# Patient Record
Sex: FEMALE | Race: WHITE
[De-identification: names, ages, dates, MRNs, and addresses within clinical notes are randomized per-mention and may not be internally consistent; named-entity substitution may affect disease eponyms.]

---

## 2018-04-10 ENCOUNTER — HOSPITAL ENCOUNTER (EMERGENCY)
Dept: HOSPITAL 17 - PHEFT | Age: 23
Discharge: HOME | End: 2018-04-10
Payer: COMMERCIAL

## 2018-04-10 VITALS
OXYGEN SATURATION: 100 % | HEART RATE: 115 BPM | SYSTOLIC BLOOD PRESSURE: 153 MMHG | DIASTOLIC BLOOD PRESSURE: 68 MMHG | RESPIRATION RATE: 16 BRPM | TEMPERATURE: 98.2 F

## 2018-04-10 VITALS — WEIGHT: 141.54 LBS | HEIGHT: 67 IN | BODY MASS INDEX: 22.21 KG/M2
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
## 2018-04-10 DIAGNOSIS — Z23: ICD-10-CM

## 2018-04-10 DIAGNOSIS — S61.217A: Primary | ICD-10-CM
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
## 2018-04-10 PROCEDURE — 12001 RPR S/N/AX/GEN/TRNK 2.5CM/<: CPT

## 2018-04-10 PROCEDURE — 90471 IMMUNIZATION ADMIN: CPT

## 2018-04-10 PROCEDURE — 90714 TD VACC NO PRESV 7 YRS+ IM: CPT

## 2018-04-10 NOTE — PD
HPI


Chief Complaint:  Laceration/Skin Injury


Time Seen by Provider:  15:54


Travel History


International Travel<30 days:  No


Contact w/Intl Traveler<30days:  No


Traveled to known affect area:  No





History of Present Illness


HPI


23-year-old female presents emergency department for evaluation of left pinky 

finger laceration as she was cutting up meat at work today.  Patient states 

that she actually got cut by a  resulting in this injury.  States 

the area bled significantly but was controllable with pressure.  States the 

pain is moderate in severity.  Aching.  Denies any numbness or tingling.  

Denies any weakness of the finger.  Denies chronic medical issues medication 

use.  She has not remember her last tetanus vaccination.  She has no other 

complaints today.





[facility]


Past Medical History


Medical History:  Denies Significant Hx


Diminished Hearing:  No


Influenza Vaccination:  No


Pregnant?:  Not Pregnant





Past Surgical History


Surgical History:  No Previous Surgery





Social History


Alcohol Use:  No


Tobacco Use:  No





Allergies-Medications


(Allergen,Severity, Reaction):  


Coded Allergies:  


     No Known Allergies (Verified  Allergy, Unknown, 4/10/18)


Reported Meds & Prescriptions





Reported Meds & Active Scripts


Active


No Active Prescriptions or Reported Medications    








Review of Systems


Except as stated in HPI:  all other systems reviewed are Neg





Physical Exam


Narrative


GENERAL: Well-nourished, well-developed patient.


SKIN: Focused skin assessment warm/dry.


Left pinky finger-distal aspect along finger pad avulsion present approximately 

1 cm without gross contamination.  No evidence of ligamental, vascular or bony 

involvement


HEAD: Normocephalic.


EYES: No scleral icterus. No injection or drainage. 


NECK: Supple, trachea midline. No JVD or lymphadenopathy.


CARDIOVASCULAR: Regular rate and rhythm without murmurs, gallops, or rubs. 


RESPIRATORY: Breath sounds equal bilaterally. No accessory muscle use. 


MUSCULOSKELETAL: No cyanosis, or edema. 


BACK: Nontender without obvious deformity. No CVA tenderness.





Data


Data


Last Documented VS





Vital Signs








  Date Time  Temp Pulse Resp B/P (MAP) Pulse Ox O2 Delivery O2 Flow Rate FiO2


 


4/10/18 15:17 98.2 115 16 153/68 (96) 100   








Orders





 Orders


Tetanus/Diphtheria Tox Adult (Tetanus/Di (4/10/18 16:00)


Lidocaine 2% Inj (Xylocaine 2% Inj) (4/10/18 16:00)


Ed Discharge Order (4/10/18 17:00)








[facility]


Medical Decision Making


Medical Screen Exam Complete:  Yes


Emergency Medical Condition:  Yes


Differential Diagnosis


Left small finger laceration, avulsion, abrasion


Narrative Course


23-year-old female presents emergency department for evaluation of left pinky 

finger laceration as she was cutting up meat at work today.  Patient states 

that she actually got cut by a  resulting in this injury.  States 

the area bled significantly but was controllable with pressure.  States the 

pain is moderate in severity.  Aching.  Denies any numbness or tingling.  

Denies any weakness of the finger.  Denies chronic medical issues medication 

use.  She has not remember her last tetanus vaccination.  She has no other 

complaints today.


Vital signs stable.


Physical exam findings consistent with an avulsion type of injury to the fifth 

pinky finger on the right hand.  She denies any numbness or tingling.


Laceration repair completed.  Advised that patient may not be able to 

completely salvage the finger pad.


Wound care instructions given.


Suture removal in 7-10 days.  Advised that the follow-up with the primary care 

physician regarding following the wound.


Patient advised to keep the area clean and dry.





Procedures


**Procedure Narrative**


LACERATION


LOCATION: Little finger left hand


LENGTH: 1 cm


NUMBER OF STITCHES/STAPLES: 6





REPAIR: The area of the laceration was prepped with Betadine and sterilely 

draped.  A digital block was performed with 2% lidocaine without epinephrine 

.the wound was copiously irrigated and explored without evidence of foreign body

, tendon injury or neurovascular  


injury.  The wound was closed using 5-0 Prolene. This was a single layer 

repair. A sterile dressing was applied. The patient was  


advised to keep the dressing clean and dry. Patient tolerated the procedure 

well.





Diagnosis





 Primary Impression:  


 Finger laceration


 Qualified Codes:  S61.217A - Laceration without foreign body of left little 

finger without damage to nail, initial encounter


Referrals:  


[facility]


Departure Forms:  Tests/Procedures, Work Release   Enter return to work date:  

Apr 13, 2018





   Special Instructions:  Avoid excessive moisture. Keep area DRY.





***Additional Instructions:  


Follow up with your primary care physician within 2-3 days. 


If your symptoms persist or worsen, return to the emergency department.


Keep area clean and dry for 24 hours.


You may bathe as normal after 24 hours but ensure you dry the area thoroughly.


Change dressings daily.


If bleeding starts again, applied pressure and elevate the area.


If he developed increased redness, swelling, or pain return to the emergency 

department.


suture removal in 7-10 days


Scripts


No Active Prescriptions or Reported Meds


Disposition:  01 DISCHARGE HOME


Condition:  Stable











Flakita Maldonado Apr 10, 2018 16:59